# Patient Record
Sex: FEMALE | Race: WHITE | NOT HISPANIC OR LATINO | Employment: OTHER | URBAN - METROPOLITAN AREA
[De-identification: names, ages, dates, MRNs, and addresses within clinical notes are randomized per-mention and may not be internally consistent; named-entity substitution may affect disease eponyms.]

---

## 2021-07-01 ENCOUNTER — OFFICE VISIT (OUTPATIENT)
Dept: CARDIOLOGY | Facility: CLINIC | Age: 76
End: 2021-07-01

## 2021-07-01 VITALS
SYSTOLIC BLOOD PRESSURE: 169 MMHG | HEIGHT: 66 IN | HEART RATE: 87 BPM | BODY MASS INDEX: 27.8 KG/M2 | WEIGHT: 173 LBS | DIASTOLIC BLOOD PRESSURE: 77 MMHG

## 2021-07-01 DIAGNOSIS — R00.0 TACHYCARDIA: ICD-10-CM

## 2021-07-01 DIAGNOSIS — I10 ESSENTIAL HYPERTENSION: ICD-10-CM

## 2021-07-01 DIAGNOSIS — G43.909 MIGRAINE WITHOUT STATUS MIGRAINOSUS, NOT INTRACTABLE, UNSPECIFIED MIGRAINE TYPE: ICD-10-CM

## 2021-07-01 DIAGNOSIS — R00.2 INTERMITTENT PALPITATIONS: Primary | ICD-10-CM

## 2021-07-01 PROCEDURE — 99203 OFFICE O/P NEW LOW 30 MIN: CPT | Performed by: INTERNAL MEDICINE

## 2021-07-01 RX ORDER — AMLODIPINE BESYLATE 5 MG/1
5 TABLET ORAL DAILY
Qty: 30 TABLET | Refills: 11 | Status: SHIPPED | OUTPATIENT
Start: 2021-07-01

## 2021-07-01 RX ORDER — OMEGA-3 FATTY ACIDS/FISH OIL 300-1000MG
800 CAPSULE ORAL
COMMUNITY

## 2021-07-01 RX ORDER — METOPROLOL SUCCINATE 50 MG/1
TABLET, EXTENDED RELEASE ORAL
COMMUNITY

## 2021-07-01 RX ORDER — LORATADINE 10 MG/1
TABLET ORAL
COMMUNITY

## 2021-07-25 NOTE — PROGRESS NOTES
Chief Complaint  Dizziness (headaches x few years  more frequent.) and Hypertension    Subjective            Domi Mckeon presents to [facility] CARDIOLOGY  History of Present Illness  Ms. Mckeon is a new patient who presents as a self-referral due to daily episodes of palpitations/tachycardia over the last few months.  She states these episodes typically last less than 5 minutes and resolve spontaneously.  She cannot identify any precipitating or alleviating factors for these episodes.  Ms. Mckeon does not report any history of known cardiac problems.  She does have a history of hypertension and frequent migraine headaches, for which she requests a referral to Neurology.  Her BP was elevated in the office today at 169/77.  No chest pain/pressure, orthopnea, PND, peripheral edema, or syncope reported.  She has not experienced any significant reduction in her physical activity level or exercise tolerance.        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   • Thyroid disorder          Past Surgical History:   • ANKLE SURGERY    RIGHT ANKLE WOUND   • CATARACT EXTRACTION    WITH LENS IMPLANT IN LEFT EYE       Social History     Tobacco Use   • Smoking status: Former Smoker     Types: Cigarettes   • Smokeless tobacco: Never Used   Vaping Use   • Vaping Use: Never used   Substance Use Topics   • Alcohol use: Not Currently   • Drug use: Never       Family History   Problem Relation Age of Onset   • Heart disease Mother    • Heart attack Mother    • Parkinsonism Father         Current Outpatient Medications on File Prior to Visit   Medication Sig   • Ibuprofen 200 MG capsule Take 800 mg by mouth.   • loratadine (Claritin) 10 MG tablet Claritin 10 mg oral tablet take 1  "tablet (10 mg) by oral route once daily   Active   • metoprolol succinate XL (Toprol XL) 50 MG 24 hr tablet Toprol XL 50 mg oral tablet extended release 24 hr take 1 tablet (50 mg) by oral route once daily   Active     No current facility-administered medications on file prior to visit.       Allergies   Allergen Reactions   • Penicillins Other (See Comments)     TOLD DOES NOT WORK ON HER/NO ALLERGIC REACTION     • Sulfa Antibiotics Nausea Only       Review of Systems   Constitutional: Positive for fatigue. Negative for chills, fever, unexpected weight gain and unexpected weight loss.   HENT: Negative for hearing loss, nosebleeds and sore throat.    Eyes: Negative for pain and visual disturbance.   Respiratory: Positive for cough. Negative for shortness of breath and wheezing.    Cardiovascular: Positive for palpitations. Negative for chest pain and leg swelling.   Gastrointestinal: Negative for abdominal pain, nausea and vomiting.   Endocrine: Negative for cold intolerance, heat intolerance and polydipsia.   Genitourinary: Negative for dysuria and hematuria.   Musculoskeletal: Positive for arthralgias. Negative for back pain and myalgias.   Skin: Negative for color change, pallor and rash.   Neurological: Positive for headache. Negative for syncope, weakness and light-headedness.   Hematological: Negative for adenopathy. Does not bruise/bleed easily.        Objective     /77 (BP Location: Right arm)   Pulse 87   Ht 167.6 cm (66\")   Wt 78.5 kg (173 lb)   BMI 27.92 kg/m²       Physical Exam  Constitutional:       General: She is not in acute distress.     Appearance: Normal appearance.   HENT:      Head: Atraumatic.      Mouth/Throat:      Mouth: Mucous membranes are moist.      Pharynx: Oropharynx is clear. No oropharyngeal exudate.   Eyes:      General: No scleral icterus.     Conjunctiva/sclera: Conjunctivae normal.   Neck:      Vascular: No carotid bruit or JVD.   Cardiovascular:      Rate and Rhythm: " Normal rate and regular rhythm. Occasional extrasystoles are present.     Chest Wall: PMI is not displaced.      Pulses: Normal pulses.           Radial pulses are 2+ on the right side and 2+ on the left side.      Heart sounds: S1 normal and S2 normal. No murmur heard.   No friction rub. No gallop. No S3 sounds.    Pulmonary:      Effort: Pulmonary effort is normal. No accessory muscle usage or respiratory distress.      Breath sounds: No decreased air movement. No wheezing, rhonchi or rales.   Chest:      Chest wall: No tenderness.   Abdominal:      General: Bowel sounds are normal. There is no distension.      Palpations: Abdomen is soft. There is no mass.      Tenderness: There is no abdominal tenderness.   Musculoskeletal:         General: No swelling, tenderness or deformity.      Cervical back: Neck supple. No tenderness.      Right lower leg: No edema.      Left lower leg: No edema.   Skin:     General: Skin is warm and dry.      Coloration: Skin is not jaundiced.      Findings: No erythema or rash.      Nails: There is no clubbing.   Neurological:      General: No focal deficit present.      Mental Status: She is alert and oriented to person, place, and time.      Motor: No weakness.   Psychiatric:         Mood and Affect: Mood normal.         Behavior: Behavior normal.         Result Review :       Assessment and Plan      Diagnoses and all orders for this visit:    1. Intermittent palpitations (Primary)  -     Holter Monitor - 24 Hour; Future  -     Adult Transthoracic Echo Complete w/ Color, Spectral and Contrast if necessary per protocol; Future    2. Tachycardia  -     Holter Monitor - 24 Hour; Future  -     Adult Transthoracic Echo Complete w/ Color, Spectral and Contrast if necessary per protocol; Future    3. Migraine without status migrainosus, not intractable, unspecified migraine type  -     Ambulatory Referral to Neurology  -     Adult Transthoracic Echo Complete w/ Color, Spectral and Contrast  if necessary per protocol; Future    4. Essential hypertension  -     amLODIPine (NORVASC) 5 MG tablet; Take 1 tablet by mouth Daily.  Dispense: 30 tablet; Refill: 11  -     Adult Transthoracic Echo Complete w/ Color, Spectral and Contrast if necessary per protocol; Future    -Palpitations/tachycardia:  Will check a 24 hour Holter monitor and an echocardiogram for further evaluation.    -Hypertension:  BP significantly elevated at 169/77 today.  Will start amlodipine 5 mg daily and continue current dose of Toprol XL.  She was instructed to keep a BP log and bring it to her next visit.      -Migraines:  Will place a Neurology referral per patient's request.  Check a bubble study with the echo to assess for possible PFO.       Follow Up     Return in about 6 months (around 1/1/2022).    Patient was given instructions and counseling regarding her condition or for health maintenance advice. Please see specific information pulled into the AVS if appropriate.     Domi ENRRIQUE Mckeon  reports that she has quit smoking. Her smoking use included cigarettes. She has never used smokeless tobacco.. I have educated her on the risk of diseases from using tobacco products such as cancer, COPD and heart disease.  Continued tobacco cessation was strongly advised.